# Patient Record
Sex: MALE | Race: WHITE | URBAN - NONMETROPOLITAN AREA
[De-identification: names, ages, dates, MRNs, and addresses within clinical notes are randomized per-mention and may not be internally consistent; named-entity substitution may affect disease eponyms.]

---

## 2021-09-04 LAB
ALBUMIN SERPL-MCNC: 4.1 G/DL
ALP BLD-CCNC: 81 U/L
ALT SERPL-CCNC: 70 U/L
ANION GAP SERPL CALCULATED.3IONS-SCNC: 13 MMOL/L
AST SERPL-CCNC: 67 U/L
AVERAGE GLUCOSE: 212
BILIRUB SERPL-MCNC: 0.3 MG/DL (ref 0.1–1.4)
BUN BLDV-MCNC: 30 MG/DL
CALCIUM SERPL-MCNC: 9.9 MG/DL
CHLORIDE BLD-SCNC: 100 MMOL/L
CHOLESTEROL, TOTAL: 250 MG/DL
CHOLESTEROL/HDL RATIO: ABNORMAL
CO2: 28 MMOL/L
CREAT SERPL-MCNC: 1.5 MG/DL
GFR CALCULATED: 49
GLUCOSE BLD-MCNC: 206 MG/DL
HBA1C MFR BLD: 9 %
HDLC SERPL-MCNC: 31 MG/DL (ref 35–70)
LDL CHOLESTEROL CALCULATED: 166 MG/DL (ref 0–160)
NONHDLC SERPL-MCNC: ABNORMAL MG/DL
POTASSIUM SERPL-SCNC: 4.7 MMOL/L
SODIUM BLD-SCNC: 136 MMOL/L
TOTAL PROTEIN: 7.4
TRIGL SERPL-MCNC: 267 MG/DL
VLDLC SERPL CALC-MCNC: ABNORMAL MG/DL

## 2021-09-07 LAB
CREATININE, URINE: 87
MICROALBUMIN/CREAT 24H UR: >1140 MG/G{CREAT}
MICROALBUMIN/CREAT UR-RTO: 1316

## 2021-10-19 PROBLEM — R53.81 DEBILITY: Status: ACTIVE | Noted: 2019-06-07

## 2021-10-19 PROBLEM — N28.9 RENAL DISEASE: Status: ACTIVE | Noted: 2019-05-10

## 2021-10-19 PROBLEM — K21.00 GASTROESOPHAGEAL REFLUX DISEASE WITH ESOPHAGITIS: Status: ACTIVE | Noted: 2019-03-29

## 2021-10-19 PROBLEM — G06.1 ABSCESS IN EPIDURAL SPACE OF THORACIC SPINE: Status: ACTIVE | Noted: 2019-05-22

## 2021-10-19 PROBLEM — E87.8 ELECTROLYTE DISORDER: Status: ACTIVE | Noted: 2019-04-30

## 2021-10-19 PROBLEM — E11.40 NEUROPATHY DUE TO TYPE 2 DIABETES MELLITUS (HCC): Status: ACTIVE | Noted: 2019-06-28

## 2021-10-19 PROBLEM — J86.9 EMPYEMA LUNG (HCC): Status: ACTIVE | Noted: 2019-05-22

## 2021-10-19 PROBLEM — M86.9 OSTEOMYELITIS (HCC): Status: ACTIVE | Noted: 2019-04-30

## 2021-10-19 PROBLEM — E78.2 MIXED HYPERLIPIDEMIA: Status: ACTIVE | Noted: 2017-09-11

## 2021-10-19 PROBLEM — E11.65 UNCONTROLLED TYPE 2 DIABETES MELLITUS WITH HYPERGLYCEMIA (HCC): Status: ACTIVE | Noted: 2019-05-30

## 2021-10-19 PROBLEM — E66.01 MORBID OBESITY (HCC): Status: ACTIVE | Noted: 2019-04-03

## 2021-10-19 PROBLEM — A49.8 KLEBSIELLA PNEUMONIAE INFECTION: Status: ACTIVE | Noted: 2019-05-22

## 2021-10-19 PROBLEM — G56.02 CARPAL TUNNEL SYNDROME OF LEFT WRIST: Status: ACTIVE | Noted: 2019-06-28

## 2021-10-19 PROBLEM — G56.20 LESION OF ULNAR NERVE: Status: ACTIVE | Noted: 2019-10-04

## 2021-10-19 PROBLEM — G56.23 LESIONS OF BOTH ULNAR NERVES: Status: ACTIVE | Noted: 2019-06-28

## 2021-10-19 PROBLEM — I10 ESSENTIAL HYPERTENSION: Status: ACTIVE | Noted: 2017-09-11

## 2021-10-19 PROBLEM — E11.9 DIABETES MELLITUS TYPE 2, NONINSULIN DEPENDENT (HCC): Status: ACTIVE | Noted: 2017-06-12

## 2021-10-19 PROBLEM — D64.9 ANEMIA: Status: ACTIVE | Noted: 2019-05-05

## 2021-10-19 PROBLEM — R79.82 CRP ELEVATED: Status: ACTIVE | Noted: 2019-05-22

## 2021-10-19 PROBLEM — J90 PLEURAL EFFUSION: Status: ACTIVE | Noted: 2019-04-30

## 2021-10-26 ENCOUNTER — OFFICE VISIT (OUTPATIENT)
Dept: NEPHROLOGY | Age: 51
End: 2021-10-26
Payer: COMMERCIAL

## 2021-10-26 VITALS
WEIGHT: 315 LBS | DIASTOLIC BLOOD PRESSURE: 75 MMHG | HEIGHT: 72 IN | BODY MASS INDEX: 42.66 KG/M2 | HEART RATE: 92 BPM | OXYGEN SATURATION: 98 % | SYSTOLIC BLOOD PRESSURE: 145 MMHG

## 2021-10-26 DIAGNOSIS — N18.31 STAGE 3A CHRONIC KIDNEY DISEASE (HCC): Primary | ICD-10-CM

## 2021-10-26 DIAGNOSIS — R80.1 PERSISTENT PROTEINURIA: ICD-10-CM

## 2021-10-26 PROCEDURE — 99204 OFFICE O/P NEW MOD 45 MIN: CPT | Performed by: INTERNAL MEDICINE

## 2021-10-26 RX ORDER — AMLODIPINE BESYLATE 2.5 MG/1
2.5 TABLET ORAL DAILY
COMMUNITY
Start: 2021-10-07 | End: 2022-06-13 | Stop reason: SDUPTHER

## 2021-10-26 RX ORDER — LOSARTAN POTASSIUM 100 MG/1
100 TABLET ORAL DAILY
COMMUNITY
Start: 2021-09-07

## 2021-10-26 RX ORDER — DULAGLUTIDE 1.5 MG/.5ML
1.5 INJECTION, SOLUTION SUBCUTANEOUS WEEKLY
COMMUNITY
Start: 2021-09-07

## 2021-10-26 RX ORDER — ROSUVASTATIN CALCIUM 5 MG/1
5 TABLET, COATED ORAL DAILY
COMMUNITY
Start: 2021-09-07 | End: 2022-06-13

## 2021-10-26 RX ORDER — PEN NEEDLE, DIABETIC 31 GX3/16"
NEEDLE, DISPOSABLE MISCELLANEOUS
COMMUNITY
Start: 2021-09-23

## 2021-10-26 RX ORDER — ACETAMINOPHEN 500 MG
325 TABLET ORAL EVERY 6 HOURS PRN
COMMUNITY

## 2021-10-26 RX ORDER — PEN NEEDLE, DIABETIC 30 GX3/16"
NEEDLE, DISPOSABLE MISCELLANEOUS
COMMUNITY
Start: 2021-09-23

## 2021-10-26 NOTE — PROGRESS NOTES
[facility]  [address] [facility] [address]  Dept: [phone]: [phone]  Outpatient Consult  [phone]  10/26/2021 3:05 PM EDT        Pt Name:    Vipul Griffin  YOB: 1970  Primary Care Physician:  LUIS M Rivera CNP     Chief Complaint:   Chief Complaint   Patient presents with    New Patient     Ref Andrez Comp         Background Information/Interval History:   The patient is a pleasant 49y.o. year old  male referred by Dorcas Ramachandran CNP for CKD III, Proteinuria. Creatinine has been ranging 1.4-1.5 mg/dL for the past year. Urine MA 1316 mg/g. He has hx of uncontrolled DM x 10-15 years, ( last A1C 9), HTN, hyperlipidemia,  Obesity, hx of osteomyelitis. States sugars are running the highest they ever have recently. Checks BP daily at home and is well controlled. No NSAIDs. No hx of stones. On losartan 100 mg daily. No gross hematuria or frothy urine. No edema. Allergies:  Lisinopril        Past Medical History:  No past medical history on file. Past Surgical History:  No past surgical history on file. Family History:  No family history on file.      Social History:  Social History     Socioeconomic History    Marital status:      Spouse name: Not on file    Number of children: Not on file    Years of education: Not on file    Highest education level: Not on file   Occupational History    Not on file   Tobacco Use    Smoking status: Not on file   Substance and Sexual Activity    Alcohol use: Not on file    Drug use: Not on file    Sexual activity: Not on file   Other Topics Concern    Not on file   Social History Narrative    Not on file     Social Determinants of Health     Financial Resource Strain:     Difficulty of Paying Living Expenses:    Food Insecurity:     Worried About Running Out of Food in the Last Year:     Ran Out of Food in the Last Year:    Transportation Needs:     Lack of Transportation (Medical):  Lack of Transportation (Non-Medical):    Physical Activity:     Days of Exercise per Week:     Minutes of Exercise per Session:    Stress:     Feeling of Stress :    Social Connections:     Frequency of Communication with Friends and Family:     Frequency of Social Gatherings with Friends and Family:     Attends Christianity Services:     Active Member of Clubs or Organizations:     Attends Club or Organization Meetings:     Marital Status:    Intimate Partner Violence:     Fear of Current or Ex-Partner:     Emotionally Abused:     Physically Abused:     Sexually Abused:         Review of Systems:  Constitutional: no fever or chills  Head: No headaches  Eyes: no blurry vision, no discharge  Ears: no ear pain or hearing changes  Nose: no runny nose or epistaxis  Respiratory: no shortness of breath or cough or sputum production  Cardiovascular: no chest pain, no edema  GI: no nausea, no vomiting or diarrhea  : denies any hematuria, no flank pain  Skin: no rash  Musculoskeletal: chronic back pain  Neuro: no tremor, no slurred speech  Psychiatric: stable mood, no depression or insomnia     Home Medications:  Prior to Admission medications    Medication Sig Start Date End Date Taking?  Authorizing Provider   acetaminophen (TYLENOL) 500 MG tablet Take 325 mg by mouth every 6 hours as needed   Yes Historical Provider, MD   Alpha-Lipoic Acid 600 MG TABS Take by mouth   Yes Historical Provider, MD   amLODIPine (NORVASC) 2.5 MG tablet Take 2.5 mg by mouth daily 10/7/21  Yes Historical Provider, MD   vitamin D (CHOLECALCIFEROL) 125 MCG (5000 UT) CAPS capsule Take 5,000 Units by mouth daily   Yes Historical Provider, MD   cyanocobalamin 500 MCG tablet Take 500 mcg by mouth daily   Yes Historical Provider, MD   Dulaglutide (TRULICITY) 1.5 KX/4.2TK SOPN Inject 1.5 mg into the skin once a week 9/7/21  Yes Historical Provider, MD   insulin glargine (LANTUS;BASAGLAR) 100 UNIT/ML injection pen Inject 42 Units into the skin nightly  9/7/21  Yes Historical Provider, MD   Insulin Pen Needle (PEN NEEDLES) 30G X 5 MM MISC One needle w his Lantus daily 9/23/21  Yes Historical Provider, MD ANDRADE PENTIPS PLUS 31G X 5 MM MISC USE 1 [place] DAILY 9/23/21  Yes Historical Provider, MD   losartan (COZAAR) 100 MG tablet Take 100 mg by mouth daily 9/7/21  Yes Historical Provider, MD   metFORMIN (GLUCOPHAGE) 1000 MG tablet Take 1,000 mg by mouth 2 times daily (with meals) 9/7/21 3/6/22 Yes Historical Provider, MD   rosuvastatin (CRESTOR) 5 MG tablet Take 5 mg by mouth daily 9/7/21 12/6/21 Yes Historical Provider, MD   Probiotic Product (PROBIOTIC-10 PO) Take by mouth   Yes Historical Provider, MD   MAGNESIUM GLYCINATE PLUS PO Take 500 mg by mouth   Yes Historical Provider, MD   ELDERBERRY PO Take 600 mg by mouth   Yes Historical Provider, MD        Physical Examination:  VITALS:  BP (!) 145/75 (Site: Left Upper Arm, Position: Sitting, Cuff Size: Large Adult)   Pulse 92   Ht 6' (1.829 m)   Wt (!) 329 lb (149.2 kg)   SpO2 98%   BMI 44.62 kg/m²   Body mass index is 44.62 kg/m². Wt Readings from Last 3 Encounters:   10/26/21 (!) 329 lb (149.2 kg)     Constitutional and General Appearance: alert and cooperative with exam, appears comfortable, no distress, obese  Eyes: no icteric sclera, no pallor conjunctiva, no discharge seen from either eye  Ears and Nose: normal external appearance of left and right ear and nose. No active drainage from nose.    Oral: moist oral mucus membranes  Neck: No jugular venous distention, appears symmetric, good ROM  Lungs: Air entry B/L, no crackles or rales, no use of accessory muscles  Heart: regular rate, S1, S2  Extremities: no LE edema  GI: soft, non-tender, no guarding  Skin: no rash seen on exposed extremities  Musculo: moves all extremities  Neuro: no slurred speech, no facial drooping, no asterixis  Psychiatric: Awake, alert, Oriented     Laboratory & Diagnostics:  CBC: No results found for: WBC, HGB, HCT, MCV, PLT  BMP:    Lab Results   Component Value Date     09/04/2021    K 4.7 09/04/2021     09/04/2021    CO2 28 09/04/2021    BUN 30 09/04/2021    CREATININE 1.50 09/04/2021    GLUCOSE 206 09/04/2021      Hepatic:   Lab Results   Component Value Date    AST 67 09/04/2021    ALT 70 09/04/2021    BILITOT 0.3 09/04/2021    ALKPHOS 81 09/04/2021     BNP: No results found for: BNP  Lipids:   Lab Results   Component Value Date    CHOL 250 09/04/2021    HDL 31 (A) 09/04/2021     INR: No results found for: INR  URINE: No results found for: NAUR, PROTUR  No results found for: NITRU, COLORU, PHUR, LABCAST, WBCUA, RBCUA, MUCUS, TRICHOMONAS, YEAST, BACTERIA, CLARITYU, SPECGRAV, LEUKOCYTESUR, UROBILINOGEN, BILIRUBINUR, BLOODU, GLUCOSEU, KETUA, AMORPHOUS   Microalbumen/Creatinine ratio:  No components found for: RUCREAT        Impression/Plan:   1. CKD III with proteinuria likely due to diabetic nephropathy. He is already on losartan 100 mg daily. Will add Farxiga 10 mg daily for anti-proteinuric effect. Repeat bmp 1 month. Discussed side effects of increased urination, increased risk for UTIs. Advised to increase water intake. Check renal US  2. Proteinuria: see above  3. DM, uncontrolled  4. HTN  5. Obesity    Return in 4 months. Thought process was discussed with the patient.   Thank you for the referral.  Please do not hesitate to contact me if you have any questions or concerns        Emely Mclaughlin, DO  [facility]

## 2021-11-01 ENCOUNTER — TELEPHONE (OUTPATIENT)
Dept: NEPHROLOGY | Age: 51
End: 2021-11-01

## 2021-11-01 DIAGNOSIS — N18.31 STAGE 3A CHRONIC KIDNEY DISEASE (HCC): ICD-10-CM

## 2022-02-14 ENCOUNTER — OFFICE VISIT (OUTPATIENT)
Dept: NEPHROLOGY | Age: 52
End: 2022-02-14
Payer: COMMERCIAL

## 2022-02-14 VITALS
BODY MASS INDEX: 41.64 KG/M2 | OXYGEN SATURATION: 97 % | DIASTOLIC BLOOD PRESSURE: 80 MMHG | WEIGHT: 307 LBS | SYSTOLIC BLOOD PRESSURE: 155 MMHG | HEART RATE: 87 BPM

## 2022-02-14 DIAGNOSIS — R80.9 PROTEINURIA, UNSPECIFIED TYPE: ICD-10-CM

## 2022-02-14 DIAGNOSIS — N18.31 STAGE 3A CHRONIC KIDNEY DISEASE (HCC): Primary | ICD-10-CM

## 2022-02-14 PROCEDURE — 99214 OFFICE O/P EST MOD 30 MIN: CPT | Performed by: INTERNAL MEDICINE

## 2022-02-14 NOTE — PROGRESS NOTES
[facility]  [address] [facility] [address]  Dept: [phone]  Loc: [phone]  Progress Note  2/14/2022 2:22 PM      Pt Name:    Catalina Mcdowell  YOB: 1970  Primary Care Physician:  LUIS M Momin - RASHEEDA     Chief Complaint:   Chief Complaint   Patient presents with    Follow-up     CKD III        History of Present Illness: This is a follow-up visit for CKD III, proteinuria. He has hx of uncontrolled DM x 10-15 years, HTN, hyperlipidemia,  Obesity, hx of osteomyelitis. He is on losartan 100 mg daily. Last visit added Farxiga 10 mg daily. Creatinine is up slightly at 1.7. Proteinuria improved. Sugars uncontrolled, A1C is 11. He is frustrated with that. Weight is down about 22 pounds from last visit. Denies NSAID use. Has been dealing with some back pain. BP high today, reports it is usually 120s-140s at home. Pertinent items are noted in HPI. Past History:  No past medical history on file. No past surgical history on file. VITALS:  BP (!) 155/80 (Site: Left Upper Arm, Position: Sitting, Cuff Size: Large Adult)   Pulse 87   Wt (!) 307 lb (139.3 kg)   SpO2 97%   BMI 41.64 kg/m²   Wt Readings from Last 3 Encounters:   02/14/22 (!) 307 lb (139.3 kg)   10/26/21 (!) 329 lb (149.2 kg)     Body mass index is 41.64 kg/m².      General Appearance: alert and cooperative with exam, appears comfortable, no distress, overweight  HEENT: EOMI, moist oral mucus membranes  Neck: No jugular venous distention,  Lungs: Air entry B/L, no crackles or rales, no use of accessory muscles  Heart: S1, S2 heard, no rub  GI: soft, non-tender, no guarding, no flank pain  Extremities: No sig LE edema, no cyanosis  Skin: warm, dry  Neurologic: no tremor, no asterixis, no focal neurologic deficits     Medications:  Current Outpatient Medications   Medication Sig Dispense Refill    acetaminophen (TYLENOL) 500 MG tablet Take 325 mg by mouth every 6 hours as needed      Alpha-Lipoic Acid 600 MG TABS Take by mouth      amLODIPine (NORVASC) 2.5 MG tablet Take 2.5 mg by mouth daily      vitamin D (CHOLECALCIFEROL) 125 MCG (5000 UT) CAPS capsule Take 5,000 Units by mouth daily      cyanocobalamin 500 MCG tablet Take 500 mcg by mouth daily      Dulaglutide (TRULICITY) 1.5 CK/4.9ZC SOPN Inject 1.5 mg into the skin once a week      insulin glargine (LANTUS;BASAGLAR) 100 UNIT/ML injection pen Inject 42 Units into the skin nightly       Insulin Pen Needle (PEN NEEDLES) 30G X 5 MM MISC One needle w his Lantus daily      UNIFINE PENTIPS PLUS 31G X 5 MM MISC USE 1 NEELE WITH LANTUS DAILY      losartan (COZAAR) 100 MG tablet Take 100 mg by mouth daily      metFORMIN (GLUCOPHAGE) 1000 MG tablet Take 1,000 mg by mouth 2 times daily (with meals)      Probiotic Product (PROBIOTIC-10 PO) Take by mouth      MAGNESIUM GLYCINATE PLUS PO Take 500 mg by mouth      ELDERBERRY PO Take 600 mg by mouth      dapagliflozin (FARXIGA) 10 MG tablet Take 1 tablet by mouth every morning 90 tablet 1    rosuvastatin (CRESTOR) 5 MG tablet Take 5 mg by mouth daily       No current facility-administered medications for this visit.         Laboratory & Diagnostics:  CBC: No results found for: WBC, HGB, HCT, MCV, PLT  BMP:    Lab Results   Component Value Date     09/04/2021    K 4.7 09/04/2021     09/04/2021    CO2 28 09/04/2021    BUN 30 09/04/2021    CREATININE 1.50 09/04/2021    GLUCOSE 206 09/04/2021      Hepatic:   Lab Results   Component Value Date    AST 67 09/04/2021    ALT 70 09/04/2021    BILITOT 0.3 09/04/2021    ALKPHOS 81 09/04/2021     BNP: No results found for: BNP  Lipids:   Lab Results   Component Value Date    CHOL 250 09/04/2021    HDL 31 (A) 09/04/2021     INR: No results found for: INR  URINE: No results found for: NAUR, PROTUR  No results found for: [place], [address] [facility], [facility], [address], RBCUA, MUCUS, TRICHOMONAS, YEAST, BACTERIA, CLARITYU, SPECGRAV, LEUKOCYTESUR, UROBILINOGEN, BILIRUBINUR, BLOODU, GLUCOSEU, KETUA, AMORPHOUS   Microalbumen/Creatinine ratio:  No components found for: RUCREAT        Impression/Plan:   1. CKD IIIb from diabetic nephropathy: slightly worse, ? Hemodynamic effect from Daphney Mottod compounded by hyperglycemia . Goals of care include slowing rate of progression by controlling blood pressure, blood glucoses and albuminuria and by avoiding nephrotoxins such as NSAIDs and IV contrast.   2. Proteinuria improved, continue losartan, continue Farxiga  3. HTN: high today reports well controlled at home. Call with bp readings in few weeks  4. DM, uncontrolled A1C is 11. F/u with PCP  5. Obesity: continue with weight loss    Bloodwork and medications were reviewed and plan of care discussed with the patient. Return to clinic in 4 months  or sooner if the need arises.       Stacy Casas, DO  [facility]

## 2022-04-21 NOTE — TELEPHONE ENCOUNTER
Nabil Montelongo called requesting a refill on the following medications:  Requested Prescriptions     Pending Prescriptions Disp Refills    dapagliflozin (FARXIGA) 10 MG tablet 90 tablet 1     Sig: Take 1 tablet by mouth every morning     Pharmacy verified: [id number]   . pv      Date of last visit: 2/14/2022  Date of next visit (if applicable): Visit date not found

## 2022-06-13 ENCOUNTER — OFFICE VISIT (OUTPATIENT)
Dept: NEPHROLOGY | Age: 52
End: 2022-06-13
Payer: COMMERCIAL

## 2022-06-13 VITALS
OXYGEN SATURATION: 98 % | BODY MASS INDEX: 42.99 KG/M2 | SYSTOLIC BLOOD PRESSURE: 144 MMHG | WEIGHT: 315 LBS | DIASTOLIC BLOOD PRESSURE: 76 MMHG | HEART RATE: 82 BPM

## 2022-06-13 DIAGNOSIS — R80.9 PROTEINURIA, UNSPECIFIED TYPE: ICD-10-CM

## 2022-06-13 DIAGNOSIS — N18.31 STAGE 3A CHRONIC KIDNEY DISEASE (HCC): Primary | ICD-10-CM

## 2022-06-13 PROCEDURE — 99214 OFFICE O/P EST MOD 30 MIN: CPT | Performed by: INTERNAL MEDICINE

## 2022-06-13 RX ORDER — AMLODIPINE BESYLATE 5 MG/1
5 TABLET ORAL DAILY
Qty: 90 TABLET | Refills: 1 | Status: SHIPPED | OUTPATIENT
Start: 2022-06-13 | End: 2022-09-11

## 2022-06-13 NOTE — PROGRESS NOTES
[facility]  [address] [facility] [address]  Dept: [phone]  Loc: [phone]  Progress Note  6/13/2022 11:17 AM      Pt Name:    Garfield Esteban  YOB: 1970  Primary Care Physician:  Ronak Graff APRN - CNP     Chief Complaint:   Chief Complaint   Patient presents with    Follow-up     CKD III        History of Present Illness: This is a follow-up visit for CKD III, proteinuria. He has hx of uncontrolled DM x 10-15 years, HTN, hyperlipidemia,  Obesity, hx of osteomyelitis. A1Cs improving 8.5 from 11. Back on glimepiride. Bps are borderline. Has had some weight gain. Feels ok. Pertinent items are noted in HPI. Past History:  No past medical history on file. No past surgical history on file. VITALS:  BP (!) 144/76 (Site: Right Upper Arm, Position: Sitting, Cuff Size: Large Adult)   Pulse 82   Wt (!) 317 lb (143.8 kg)   SpO2 98%   BMI 42.99 kg/m²   Wt Readings from Last 3 Encounters:   06/13/22 (!) 317 lb (143.8 kg)   02/14/22 (!) 307 lb (139.3 kg)   10/26/21 (!) 329 lb (149.2 kg)     Body mass index is 42.99 kg/m².      General Appearance: alert and cooperative with exam, appears comfortable, no distress, overweight  HEENT: EOMI, moist oral mucus membranes  Neck: No jugular venous distention,  Lungs: Air entry B/L, no crackles or rales, no use of accessory muscles  Heart: S1, S2 heard, no rub  GI: soft, non-tender, no guarding, no flank pain  Extremities: trace LE edema  Skin: warm, dry  Neurologic: no tremor, no asterixis, no focal neurologic deficits     Medications:  Current Outpatient Medications   Medication Sig Dispense Refill    dapagliflozin (FARXIGA) 10 MG tablet Take 1 tablet by mouth every morning 90 tablet 1    acetaminophen (TYLENOL) 500 MG tablet Take 325 mg by mouth every 6 hours as needed      Alpha-Lipoic Acid 600 MG TABS Take by mouth      amLODIPine (NORVASC) 2.5 MG tablet Take 2.5 mg by mouth daily      vitamin D (CHOLECALCIFEROL) 125 MCG (5000 UT) CAPS capsule Take 5,000 Units by mouth daily      cyanocobalamin 500 MCG tablet Take 500 mcg by mouth daily      Dulaglutide (TRULICITY) 1.5 GZ/6.4BH SOPN Inject 1.5 mg into the skin once a week      insulin glargine (LANTUS;BASAGLAR) 100 UNIT/ML injection pen Inject 42 Units into the skin nightly       Insulin Pen Needle (PEN NEEDLES) 30G X 5 MM MISC One needle w his Lantus daily      UNIFINE PENTIPS PLUS 31G X 5 MM MISC USE 1 NEELE WITH LANTUS DAILY      losartan (COZAAR) 100 MG tablet Take 100 mg by mouth daily      metFORMIN (GLUCOPHAGE) 1000 MG tablet Take 1,000 mg by mouth 2 times daily (with meals)      rosuvastatin (CRESTOR) 5 MG tablet Take 5 mg by mouth daily      Probiotic Product (PROBIOTIC-10 PO) Take by mouth      MAGNESIUM GLYCINATE PLUS PO Take 500 mg by mouth      ELDERBERRY PO Take 600 mg by mouth       No current facility-administered medications for this visit.         Laboratory & Diagnostics:  CBC: No results found for: WBC, HGB, HCT, MCV, PLT  BMP:    Lab Results   Component Value Date     09/04/2021    K 4.7 09/04/2021     09/04/2021    CO2 28 09/04/2021    BUN 30 09/04/2021    CREATININE 1.50 09/04/2021    GLUCOSE 206 09/04/2021      Hepatic:   Lab Results   Component Value Date    AST 67 09/04/2021    ALT 70 09/04/2021    BILITOT 0.3 09/04/2021    ALKPHOS 81 09/04/2021     BNP: No results found for: BNP  Lipids:   Lab Results   Component Value Date    CHOL 250 09/04/2021    HDL 31 (A) 09/04/2021     INR: No results found for: INR  URINE: No results found for: NAUR, PROTUR  No results found for: NITRU, COLORU, PHUR, LABCAST, WBCUA, RBCUA, MUCUS, TRICHOMONAS, YEAST, BACTERIA, CLARITYU, SPECGRAV, LEUKOCYTESUR, UROBILINOGEN, BILIRUBINUR, BLOODU, GLUCOSEU, KETUA, AMORPHOUS   Microalbumen/Creatinine ratio:  No components found for: RUCREAT        Impression/Plan: 1.  CKD III from diabetic nephropathy: stable  Goals of care include slowing rate of progression by controlling blood pressure, blood glucoses and albuminuria and by avoiding nephrotoxins such as NSAIDs and IV contrast.   2. 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 HTN: increase amlodipine to 5 mg daily  4. DM, improving, f/u with PCP  5. Obesity:    Bloodwork and medications were reviewed and plan of care discussed with the patient. Return to clinic in 6 months  or sooner if the need arises.       Dena Alvarado DO  [facility]

## 2022-12-06 LAB — PTH INTACT: 31

## 2022-12-12 ENCOUNTER — OFFICE VISIT (OUTPATIENT)
Dept: NEPHROLOGY | Age: 52
End: 2022-12-12
Payer: COMMERCIAL

## 2022-12-12 VITALS
BODY MASS INDEX: 43.81 KG/M2 | WEIGHT: 315 LBS | HEART RATE: 84 BPM | OXYGEN SATURATION: 98 % | SYSTOLIC BLOOD PRESSURE: 151 MMHG | DIASTOLIC BLOOD PRESSURE: 82 MMHG

## 2022-12-12 DIAGNOSIS — E11.21 DIABETIC NEPHROPATHY ASSOCIATED WITH TYPE 2 DIABETES MELLITUS (HCC): ICD-10-CM

## 2022-12-12 DIAGNOSIS — N18.31 STAGE 3A CHRONIC KIDNEY DISEASE (HCC): Primary | ICD-10-CM

## 2022-12-12 DIAGNOSIS — R80.1 PERSISTENT PROTEINURIA: ICD-10-CM

## 2022-12-12 PROCEDURE — 99214 OFFICE O/P EST MOD 30 MIN: CPT | Performed by: INTERNAL MEDICINE

## 2022-12-12 PROCEDURE — 3077F SYST BP >= 140 MM HG: CPT | Performed by: INTERNAL MEDICINE

## 2022-12-12 PROCEDURE — 3078F DIAST BP <80 MM HG: CPT | Performed by: INTERNAL MEDICINE

## 2022-12-12 RX ORDER — FINERENONE 10 MG/1
10 TABLET, FILM COATED ORAL DAILY
Qty: 30 TABLET | Refills: 5 | Status: SHIPPED | OUTPATIENT
Start: 2022-12-12 | End: 2023-01-11

## 2022-12-12 NOTE — PROGRESS NOTES
[facility]  [address] [facility] [address]  Dept: [phone]  Loc: [phone]  Progress Note  12/12/2022 1:45 PM      Pt Name:    Ida Storm  YOB: 1970  Primary Care Physician:  Silverio Randolph, APRN - CNP     Chief Complaint:   Chief Complaint   Patient presents with    Follow-up     CKD III        History of Present Illness: This is a follow-up visit for CKD III, proteinuria. He has hx of uncontrolled DM x 10-15 years, HTN, hyperlipidemia,  Obesity, hx of osteomyelitis. Feeling ok. Bp higher here he states at home is 453J-001L systolic. A1C was 8.5. Pertinent items are noted in HPI. Past History:  No past medical history on file. No past surgical history on file. VITALS:  BP (!) 151/82 (Site: Left Upper Arm, Position: Sitting, Cuff Size: Large Adult)   Pulse 84   Wt (!) 323 lb (146.5 kg)   SpO2 98%   BMI 43.81 kg/m²   Wt Readings from Last 3 Encounters:   12/12/22 (!) 323 lb (146.5 kg)   06/13/22 (!) 317 lb (143.8 kg)   02/14/22 (!) 307 lb (139.3 kg)     Body mass index is 43.81 kg/m².      General Appearance: alert and cooperative with exam, appears comfortable, no distress, overweight  HEENT: EOMI, moist oral mucus membranes  Neck: No jugular venous distention,  Lungs: Air entry B/L, no crackles or rales, no use of accessory muscles  Heart: S1, S2 heard, no rub  GI: soft, non-tender, no guarding, no flank pain  Extremities: trace LE edema  Skin: warm, dry  Neurologic: no tremor, no asterixis     Medications:  Current Outpatient Medications   Medication Sig Dispense Refill    Finerenone (KERENDIA) 10 MG TABS Take 10 mg by mouth daily 30 tablet 5    dapagliflozin (FARXIGA) 10 MG tablet Take 1 tablet by mouth every morning 90 tablet 3    amLODIPine (NORVASC) 5 MG tablet Take 1 tablet by mouth daily 90 tablet 1    acetaminophen (TYLENOL) 500 MG tablet Take 325 mg by mouth every 6 hours as needed      Alpha-Lipoic Acid 600 MG TABS Take by mouth      vitamin D (CHOLECALCIFEROL) 125 MCG (5000 UT) CAPS capsule Take 5,000 Units by mouth daily      cyanocobalamin 500 MCG tablet Take 500 mcg by mouth daily      Dulaglutide (TRULICITY) 1.5 PP/2.3TO SOPN Inject 1.5 mg into the skin once a week      insulin glargine (LANTUS;BASAGLAR) 100 UNIT/ML injection pen Inject 42 Units into the skin nightly       Insulin Pen Needle (PEN NEEDLES) 30G X 5 MM MISC One needle w his Lantus daily      UNIFINE PENTIPS PLUS 31G X 5 MM MISC USE 1 NEELE WITH LANTUS DAILY      losartan (COZAAR) 100 MG tablet Take 100 mg by mouth daily      metFORMIN (GLUCOPHAGE) 1000 MG tablet Take 1,000 mg by mouth 2 times daily (with meals)      rosuvastatin (CRESTOR) 5 MG tablet Take 5 mg by mouth daily      Probiotic Product (PROBIOTIC-10 PO) Take by mouth      MAGNESIUM GLYCINATE PLUS PO Take 500 mg by mouth      ELDERBERRY PO Take 600 mg by mouth       No current facility-administered medications for this visit.         Laboratory & Diagnostics:  CBC: No results found for: WBC, HGB, HCT, MCV, PLT  BMP:    Lab Results   Component Value Date     09/04/2021    K 4.7 09/04/2021     09/04/2021    CO2 28 09/04/2021    BUN 30 09/04/2021    CREATININE 1.50 09/04/2021    GLUCOSE 206 09/04/2021      Hepatic:   Lab Results   Component Value Date    AST 67 09/04/2021    ALT 70 09/04/2021    BILITOT 0.3 09/04/2021    ALKPHOS 81 09/04/2021     BNP: No results found for: BNP  Lipids:   Lab Results   Component Value Date    CHOL 250 09/04/2021    HDL 31 (A) 09/04/2021     INR: No results found for: INR  URINE: No results found for: NAUR, PROTUR  No results found for: NITRU, COLORU, PHUR, LABCAST, WBCUA, RBCUA, MUCUS, TRICHOMONAS, YEAST, BACTERIA, CLARITYU, SPECGRAV, LEUKOCYTESUR, UROBILINOGEN, BILIRUBINUR, BLOODU, GLUCOSEU, KETUA, AMORPHOUS   Microalbumen/Creatinine ratio:  No components found for: RUCREAT Impression/Plan:   1. CKD III from diabetic nephropathy: bmp not done but we are repeating labs in a few weeks since starting [place]. -on losartan, [place], and will start him on [place] as he still has significant proteinuria. Repeat bmp 3 weeks after starting it. Goals of care include slowing rate of progression by controlling blood pressure, blood glucoses and albuminuria and by avoiding nephrotoxins such as NSAIDs and IV contrast.   2. 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 HTN: better at home  4. DM, improving, f/u with PCP  5. Obesity:    Bloodwork and medications were reviewed and plan of care discussed with the patient. Return to clinic in 6 months  or sooner if the need arises.       Madi Lobato,   [facility]

## 2022-12-19 PROBLEM — R80.9 PROTEINURIA: Status: ACTIVE | Noted: 2021-09-21

## 2023-06-05 LAB
BUN BLDV-MCNC: 32 MG/DL
CALCIUM SERPL-MCNC: 9.7 MG/DL
CHLORIDE BLD-SCNC: 102 MMOL/L
CO2: 28 MMOL/L
CREAT SERPL-MCNC: 1.5 MG/DL
CREATININE, URINE: 72
EGFR: 56
GLUCOSE BLD-MCNC: 138 MG/DL
MICROALBUMIN/CREAT 24H UR: 753 MG/G{CREAT}
MICROALBUMIN/CREAT UR-RTO: 1046
POTASSIUM SERPL-SCNC: 4.8 MMOL/L
SODIUM BLD-SCNC: 137 MMOL/L

## 2023-12-04 LAB — PTH INTACT: 16

## 2023-12-11 ENCOUNTER — OFFICE VISIT (OUTPATIENT)
Dept: NEPHROLOGY | Age: 53
End: 2023-12-11
Payer: COMMERCIAL

## 2023-12-11 VITALS
SYSTOLIC BLOOD PRESSURE: 149 MMHG | WEIGHT: 315 LBS | DIASTOLIC BLOOD PRESSURE: 73 MMHG | HEART RATE: 88 BPM | BODY MASS INDEX: 43.4 KG/M2 | OXYGEN SATURATION: 96 %

## 2023-12-11 DIAGNOSIS — N18.31 STAGE 3A CHRONIC KIDNEY DISEASE (HCC): Primary | ICD-10-CM

## 2023-12-11 DIAGNOSIS — R80.1 PERSISTENT PROTEINURIA: ICD-10-CM

## 2023-12-11 PROCEDURE — 3077F SYST BP >= 140 MM HG: CPT | Performed by: INTERNAL MEDICINE

## 2023-12-11 PROCEDURE — 3078F DIAST BP <80 MM HG: CPT | Performed by: INTERNAL MEDICINE

## 2023-12-11 PROCEDURE — 99214 OFFICE O/P EST MOD 30 MIN: CPT | Performed by: INTERNAL MEDICINE

## 2023-12-11 RX ORDER — AMLODIPINE BESYLATE 10 MG/1
10 TABLET ORAL DAILY
Qty: 90 TABLET | Refills: 3 | Status: SHIPPED | OUTPATIENT
Start: 2023-12-11 | End: 2024-03-10

## 2024-06-10 ENCOUNTER — OFFICE VISIT (OUTPATIENT)
Dept: NEPHROLOGY | Age: 54
End: 2024-06-10
Payer: COMMERCIAL

## 2024-06-10 VITALS
BODY MASS INDEX: 44.21 KG/M2 | WEIGHT: 315 LBS | SYSTOLIC BLOOD PRESSURE: 139 MMHG | DIASTOLIC BLOOD PRESSURE: 74 MMHG | HEART RATE: 76 BPM | OXYGEN SATURATION: 98 %

## 2024-06-10 DIAGNOSIS — N18.31 STAGE 3A CHRONIC KIDNEY DISEASE (HCC): Primary | ICD-10-CM

## 2024-06-10 PROCEDURE — 3075F SYST BP GE 130 - 139MM HG: CPT | Performed by: INTERNAL MEDICINE

## 2024-06-10 PROCEDURE — 3078F DIAST BP <80 MM HG: CPT | Performed by: INTERNAL MEDICINE

## 2024-06-10 PROCEDURE — 99214 OFFICE O/P EST MOD 30 MIN: CPT | Performed by: INTERNAL MEDICINE

## 2024-06-10 NOTE — PROGRESS NOTES
[facility]  [facility]  [address] [facility] [address]  Dept: [phone]  Loc: [phone]  Progress Note  6/10/2024 2:14 PM      Pt Name:    Aguilar Nolen  YOB: 1970  Primary Care Physician:  Juan Ornelas DO     Chief Complaint:   Chief Complaint   Patient presents with    Follow-up     CKD III        History of Present Illness:   This is a follow-up visit for CKD III, proteinuria. He has hx of uncontrolled DM x 10-15 years, HTN, hyperlipidemia,  Obesity, hx of osteomyelitis.    Last A1C up to 9.   Bp ok.   Feels best when his sugars are 150s.  No nsaids.  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       Pertinent items are noted in HPI.         Past History:  No past medical history on file.  No past surgical history on file.     VITALS:  /74 (Site: Left Upper Arm, Position: Sitting, Cuff Size: Large Adult)   Pulse 76   Wt (!) 147.9 kg (326 lb)   SpO2 98%   BMI 44.21 kg/m²   Wt Readings from Last 3 Encounters:   06/10/24 (!) 147.9 kg (326 lb)   12/11/23 (!) 145.2 kg (320 lb)   06/12/23 (!) 144.7 kg (319 lb)     Body mass index is 44.21 kg/m².     General Appearance: alert and cooperative with exam, appears comfortable, no distress, overweight  HEENT: EOMI, moist oral mucus membranes  Neck: No jugular venous distention,  Lungs: Air entry B/L, no crackles or rales, no use of accessory muscles  Heart: S1, S2 heard, no rub  GI: soft, non-tender, no guarding,  Extremities: trace LE edema  Skin: warm, dry  Neurologic: no tremor, no asterixis     Medications:  Current Outpatient Medications   Medication Sig Dispense Refill    amLODIPine (NORVASC) 10 MG tablet Take 1 tablet by mouth daily 90 tablet 3    dapagliflozin (FARXIGA) 10 MG tablet Take 1 tablet by mouth every morning 90 tablet 3    Finerenone 10 MG TABS Take 10 mg by mouth daily 90 tablet 3

## 2024-06-17 RX ORDER — DAPAGLIFLOZIN 10 MG/1
10 TABLET, FILM COATED ORAL EVERY MORNING
Qty: 90 TABLET | Refills: 3 | Status: SHIPPED | OUTPATIENT
Start: 2024-06-17

## 2024-06-17 NOTE — TELEPHONE ENCOUNTER
Patient called requesting refills on Farxiga and Kerendia. Scripts pending to [facility] pharmacy.

## 2024-12-09 ENCOUNTER — OFFICE VISIT (OUTPATIENT)
Dept: NEPHROLOGY | Age: 54
End: 2024-12-09
Payer: COMMERCIAL

## 2024-12-09 VITALS
OXYGEN SATURATION: 98 % | WEIGHT: 315 LBS | HEART RATE: 84 BPM | DIASTOLIC BLOOD PRESSURE: 85 MMHG | BODY MASS INDEX: 44.21 KG/M2 | SYSTOLIC BLOOD PRESSURE: 129 MMHG

## 2024-12-09 DIAGNOSIS — R80.1 PERSISTENT PROTEINURIA: ICD-10-CM

## 2024-12-09 DIAGNOSIS — N18.31 STAGE 3A CHRONIC KIDNEY DISEASE (HCC): Primary | ICD-10-CM

## 2024-12-09 PROCEDURE — 99214 OFFICE O/P EST MOD 30 MIN: CPT | Performed by: INTERNAL MEDICINE

## 2024-12-09 PROCEDURE — 3074F SYST BP LT 130 MM HG: CPT | Performed by: INTERNAL MEDICINE

## 2024-12-09 PROCEDURE — 3079F DIAST BP 80-89 MM HG: CPT | Performed by: INTERNAL MEDICINE

## 2024-12-09 PROCEDURE — G2211 COMPLEX E/M VISIT ADD ON: HCPCS | Performed by: INTERNAL MEDICINE

## 2024-12-09 RX ORDER — DAPAGLIFLOZIN 10 MG/1
10 TABLET, FILM COATED ORAL EVERY MORNING
Qty: 90 TABLET | Refills: 3 | Status: SHIPPED | OUTPATIENT
Start: 2024-12-09

## 2024-12-09 RX ORDER — AMLODIPINE BESYLATE 10 MG/1
10 TABLET ORAL DAILY
Qty: 90 TABLET | Refills: 3 | Status: SHIPPED | OUTPATIENT
Start: 2024-12-09 | End: 2025-03-09

## 2024-12-09 NOTE — PROGRESS NOTES
[facility] [place] [facility]  [facility] [place] [facility]  [address]  [facility] [address]  Dept: [phone]  Loc: [phone]  Progress Note  12/9/2024 11:14 AM      Pt Name:    Aguilar Nolen  YOB: 1970  Primary Care Physician:  Juan Ornelas DO     Chief Complaint:   Chief Complaint   Patient presents with    Follow-up     CKD III        History of Present Illness:   This is a follow-up visit for CKD III, proteinuria. He has hx of uncontrolled DM x 10-15 years, HTN, hyperlipidemia,  Obesity, hx of osteomyelitis.    No new issues.   Bp stable.   Due for A1C soon.       Pertinent items are noted in HPI.         Past History:  No past medical history on file.  No past surgical history on file.     VITALS:  /85 (Site: Left Upper Arm, Position: Sitting, Cuff Size: Large Adult)   Pulse 84   Wt (!) 147.9 kg (326 lb)   SpO2 98%   BMI 44.21 kg/m²   Wt Readings from Last 3 Encounters:   12/09/24 (!) 147.9 kg (326 lb)   06/10/24 (!) 147.9 kg (326 lb)   12/11/23 (!) 145.2 kg (320 lb)     Body mass index is 44.21 kg/m².     General Appearance: alert and cooperative with exam, appears comfortable, no distress, overweight  HEENT: EOMI, moist oral mucus membranes  Neck: No jugular venous distention,  Lungs: Air entry B/L, no crackles or rales, no use of accessory muscles  Heart: S1, S2 heard, no rub  GI: soft, non-tender, no guarding,  Extremities: trace LE edema  Skin: warm, dry  Neurologic: no slurred speech     Medications:  Current Outpatient Medications   Medication Sig Dispense Refill    dapagliflozin (FARXIGA) 10 MG tablet Take 1 tablet by mouth every morning 90 tablet 3    Finerenone 10 MG TABS Take 10 mg by mouth daily 90 tablet 3    amLODIPine (NORVASC) 10 MG tablet Take 1 tablet by mouth daily 90 tablet 3    acetaminophen (TYLENOL) 500 MG tablet Take 325 mg by mouth every 6 hours as needed      Alpha-Lipoic Acid 600 MG TABS Take by mouth      vitamin D

## 2025-06-09 ENCOUNTER — OFFICE VISIT (OUTPATIENT)
Dept: NEPHROLOGY | Age: 55
End: 2025-06-09
Payer: COMMERCIAL

## 2025-06-09 VITALS
WEIGHT: 315 LBS | SYSTOLIC BLOOD PRESSURE: 138 MMHG | BODY MASS INDEX: 44.21 KG/M2 | DIASTOLIC BLOOD PRESSURE: 80 MMHG | HEART RATE: 84 BPM | OXYGEN SATURATION: 98 %

## 2025-06-09 DIAGNOSIS — N18.31 STAGE 3A CHRONIC KIDNEY DISEASE (HCC): Primary | ICD-10-CM

## 2025-06-09 PROCEDURE — 3079F DIAST BP 80-89 MM HG: CPT | Performed by: INTERNAL MEDICINE

## 2025-06-09 PROCEDURE — 3075F SYST BP GE 130 - 139MM HG: CPT | Performed by: INTERNAL MEDICINE

## 2025-06-09 PROCEDURE — 99214 OFFICE O/P EST MOD 30 MIN: CPT | Performed by: INTERNAL MEDICINE

## 2025-06-09 NOTE — PROGRESS NOTES
[facility] [place] [facility]  [facility]  [address] [facility] [address]  Dept: [phone]  Loc: [phone]  Progress Note  6/9/2025 11:18 AM      Pt Name:    Aguilar Nolen  YOB: 1970  Primary Care Physician:  Juan Ornelas DO     Chief Complaint:   Chief Complaint   Patient presents with    Follow-up     CKD III        History of Present Illness:   This is a follow-up visit for CKD III, proteinuria. He has hx of uncontrolled DM x 10-15 years, HTN, hyperlipidemia,  Obesity, hx of osteomyelitis.    He feels ok.   A1C was high in the 9s.   Bp stable.   He stopped his vitamin D last visit but vitamin D remains high.  Does take a few supplements and calcium.       Pertinent items are noted in HPI.         Past History:  No past medical history on file.  No past surgical history on file.     VITALS:  /80 (BP Site: Right Lower Arm, Patient Position: Sitting, BP Cuff Size: Large Adult)   Pulse 84   Wt (!) 147.9 kg (326 lb)   SpO2 98%   BMI 44.21 kg/m²   Wt Readings from Last 3 Encounters:   06/09/25 (!) 147.9 kg (326 lb)   12/09/24 (!) 147.9 kg (326 lb)   06/10/24 (!) 147.9 kg (326 lb)     Body mass index is 44.21 kg/m².     General Appearance: alert and cooperative with exam, appears comfortable, no distress, overweight  HEENT: EOMI, moist oral mucus membranes  Neck: No jugular venous distention,  Lungs: Air entry B/L, no crackles or rales, no use of accessory muscles  Heart: S1, S2 heard, no rub  GI: soft, non-tender, no guarding,  Extremities: trace LE edema  Skin: warm, dry  Neurologic: no slurred speech     Medications:  Current Outpatient Medications   Medication Sig Dispense Refill    Finerenone 10 MG TABS Take 10 mg by mouth daily 90 tablet 3    amLODIPine (NORVASC) 10 MG tablet Take 1 tablet by mouth daily 90 tablet 3    dapagliflozin (FARXIGA) 10 MG tablet Take 1 tablet by mouth every morning 90 tablet 3